# Patient Record
Sex: MALE | Race: OTHER | NOT HISPANIC OR LATINO | URBAN - METROPOLITAN AREA
[De-identification: names, ages, dates, MRNs, and addresses within clinical notes are randomized per-mention and may not be internally consistent; named-entity substitution may affect disease eponyms.]

---

## 2020-09-18 VITALS
OXYGEN SATURATION: 99 % | SYSTOLIC BLOOD PRESSURE: 137 MMHG | TEMPERATURE: 98 F | HEIGHT: 77 IN | DIASTOLIC BLOOD PRESSURE: 86 MMHG | HEART RATE: 77 BPM | WEIGHT: 199.96 LBS | RESPIRATION RATE: 18 BRPM

## 2020-09-18 LAB
ALBUMIN SERPL ELPH-MCNC: 4.4 G/DL — SIGNIFICANT CHANGE UP (ref 3.3–5)
ALP SERPL-CCNC: 62 U/L — SIGNIFICANT CHANGE UP (ref 40–120)
ALT FLD-CCNC: 7 U/L — LOW (ref 10–45)
ANION GAP SERPL CALC-SCNC: 12 MMOL/L — SIGNIFICANT CHANGE UP (ref 5–17)
APPEARANCE UR: CLEAR — SIGNIFICANT CHANGE UP
APTT BLD: 32.2 SEC — SIGNIFICANT CHANGE UP (ref 27.5–35.5)
AST SERPL-CCNC: 26 U/L — SIGNIFICANT CHANGE UP (ref 10–40)
BASOPHILS # BLD AUTO: 0.03 K/UL — SIGNIFICANT CHANGE UP (ref 0–0.2)
BASOPHILS NFR BLD AUTO: 0.5 % — SIGNIFICANT CHANGE UP (ref 0–2)
BILIRUB SERPL-MCNC: 0.7 MG/DL — SIGNIFICANT CHANGE UP (ref 0.2–1.2)
BILIRUB UR-MCNC: NEGATIVE — SIGNIFICANT CHANGE UP
BUN SERPL-MCNC: 21 MG/DL — SIGNIFICANT CHANGE UP (ref 7–23)
CALCIUM SERPL-MCNC: 9.5 MG/DL — SIGNIFICANT CHANGE UP (ref 8.4–10.5)
CHLORIDE SERPL-SCNC: 105 MMOL/L — SIGNIFICANT CHANGE UP (ref 96–108)
CK MB CFR SERPL CALC: 2.3 NG/ML — SIGNIFICANT CHANGE UP (ref 0–6.7)
CK SERPL-CCNC: 147 U/L — SIGNIFICANT CHANGE UP (ref 30–200)
CO2 SERPL-SCNC: 22 MMOL/L — SIGNIFICANT CHANGE UP (ref 22–31)
COLOR SPEC: YELLOW — SIGNIFICANT CHANGE UP
CREAT SERPL-MCNC: 1.09 MG/DL — SIGNIFICANT CHANGE UP (ref 0.5–1.3)
DIFF PNL FLD: NEGATIVE — SIGNIFICANT CHANGE UP
EOSINOPHIL # BLD AUTO: 0.06 K/UL — SIGNIFICANT CHANGE UP (ref 0–0.5)
EOSINOPHIL NFR BLD AUTO: 1.1 % — SIGNIFICANT CHANGE UP (ref 0–6)
GLUCOSE SERPL-MCNC: 121 MG/DL — HIGH (ref 70–99)
GLUCOSE UR QL: NEGATIVE — SIGNIFICANT CHANGE UP
HCT VFR BLD CALC: 43.7 % — SIGNIFICANT CHANGE UP (ref 39–50)
HGB BLD-MCNC: 14.7 G/DL — SIGNIFICANT CHANGE UP (ref 13–17)
IMM GRANULOCYTES NFR BLD AUTO: 0.2 % — SIGNIFICANT CHANGE UP (ref 0–1.5)
INR BLD: 0.97 — SIGNIFICANT CHANGE UP (ref 0.88–1.16)
KETONES UR-MCNC: NEGATIVE — SIGNIFICANT CHANGE UP
LEUKOCYTE ESTERASE UR-ACNC: NEGATIVE — SIGNIFICANT CHANGE UP
LIDOCAIN IGE QN: 55 U/L — SIGNIFICANT CHANGE UP (ref 7–60)
LYMPHOCYTES # BLD AUTO: 1.69 K/UL — SIGNIFICANT CHANGE UP (ref 1–3.3)
LYMPHOCYTES # BLD AUTO: 30.3 % — SIGNIFICANT CHANGE UP (ref 13–44)
MCHC RBC-ENTMCNC: 28.4 PG — SIGNIFICANT CHANGE UP (ref 27–34)
MCHC RBC-ENTMCNC: 33.6 GM/DL — SIGNIFICANT CHANGE UP (ref 32–36)
MCV RBC AUTO: 84.4 FL — SIGNIFICANT CHANGE UP (ref 80–100)
MONOCYTES # BLD AUTO: 0.37 K/UL — SIGNIFICANT CHANGE UP (ref 0–0.9)
MONOCYTES NFR BLD AUTO: 6.6 % — SIGNIFICANT CHANGE UP (ref 2–14)
NEUTROPHILS # BLD AUTO: 3.41 K/UL — SIGNIFICANT CHANGE UP (ref 1.8–7.4)
NEUTROPHILS NFR BLD AUTO: 61.3 % — SIGNIFICANT CHANGE UP (ref 43–77)
NITRITE UR-MCNC: NEGATIVE — SIGNIFICANT CHANGE UP
NRBC # BLD: 0 /100 WBCS — SIGNIFICANT CHANGE UP (ref 0–0)
PH UR: 6 — SIGNIFICANT CHANGE UP (ref 5–8)
PLATELET # BLD AUTO: 217 K/UL — SIGNIFICANT CHANGE UP (ref 150–400)
POTASSIUM SERPL-MCNC: 4.2 MMOL/L — SIGNIFICANT CHANGE UP (ref 3.5–5.3)
POTASSIUM SERPL-SCNC: 4.2 MMOL/L — SIGNIFICANT CHANGE UP (ref 3.5–5.3)
PROT SERPL-MCNC: 7.1 G/DL — SIGNIFICANT CHANGE UP (ref 6–8.3)
PROT UR-MCNC: NEGATIVE MG/DL — SIGNIFICANT CHANGE UP
PROTHROM AB SERPL-ACNC: 11.6 SEC — SIGNIFICANT CHANGE UP (ref 10.6–13.6)
RBC # BLD: 5.18 M/UL — SIGNIFICANT CHANGE UP (ref 4.2–5.8)
RBC # FLD: 12.4 % — SIGNIFICANT CHANGE UP (ref 10.3–14.5)
SODIUM SERPL-SCNC: 139 MMOL/L — SIGNIFICANT CHANGE UP (ref 135–145)
SP GR SPEC: 1.01 — SIGNIFICANT CHANGE UP (ref 1–1.03)
TROPONIN T SERPL-MCNC: <0.01 NG/ML — SIGNIFICANT CHANGE UP (ref 0–0.01)
UROBILINOGEN FLD QL: 0.2 E.U./DL — SIGNIFICANT CHANGE UP
WBC # BLD: 5.57 K/UL — SIGNIFICANT CHANGE UP (ref 3.8–10.5)
WBC # FLD AUTO: 5.57 K/UL — SIGNIFICANT CHANGE UP (ref 3.8–10.5)

## 2020-09-18 PROCEDURE — 71275 CT ANGIOGRAPHY CHEST: CPT | Mod: 26

## 2020-09-18 RX ORDER — FAMOTIDINE 10 MG/ML
20 INJECTION INTRAVENOUS ONCE
Refills: 0 | Status: COMPLETED | OUTPATIENT
Start: 2020-09-18 | End: 2020-09-18

## 2020-09-18 RX ORDER — PREGABALIN 225 MG/1
1 CAPSULE ORAL
Qty: 0 | Refills: 0 | DISCHARGE

## 2020-09-18 RX ORDER — ASPIRIN/CALCIUM CARB/MAGNESIUM 324 MG
325 TABLET ORAL ONCE
Refills: 0 | Status: COMPLETED | OUTPATIENT
Start: 2020-09-18 | End: 2020-09-18

## 2020-09-18 RX ORDER — MAGNESIUM OXIDE 400 MG ORAL TABLET 241.3 MG
800 TABLET ORAL
Qty: 0 | Refills: 0 | DISCHARGE

## 2020-09-18 RX ORDER — CHOLECALCIFEROL (VITAMIN D3) 125 MCG
0 CAPSULE ORAL
Qty: 0 | Refills: 0 | DISCHARGE

## 2020-09-18 NOTE — ED ADULT NURSE NOTE - NSIMPLEMENTINTERV_GEN_ALL_ED
Implemented All Universal Safety Interventions:  [place] to call system. Call bell, personal items and telephone within reach. Instruct patient to call for assistance. Room bathroom lighting operational. Non-slip footwear when patient is off stretcher. Physically safe environment: no spills, clutter or unnecessary equipment. Stretcher in lowest position, wheels locked, appropriate side rails in place.

## 2020-09-19 ENCOUNTER — INPATIENT (INPATIENT)
Facility: HOSPITAL | Age: 50
LOS: 0 days | Discharge: HOME | DRG: 313 | End: 2020-09-19
Attending: INTERNAL MEDICINE | Admitting: INTERNAL MEDICINE
Payer: COMMERCIAL

## 2020-09-19 VITALS
DIASTOLIC BLOOD PRESSURE: 79 MMHG | SYSTOLIC BLOOD PRESSURE: 122 MMHG | OXYGEN SATURATION: 97 % | TEMPERATURE: 98 F | HEART RATE: 72 BPM | RESPIRATION RATE: 18 BRPM

## 2020-09-19 DIAGNOSIS — R07.9 CHEST PAIN, UNSPECIFIED: ICD-10-CM

## 2020-09-19 LAB
A1C WITH ESTIMATED AVERAGE GLUCOSE RESULT: 5.2 % — SIGNIFICANT CHANGE UP (ref 4–5.6)
ALBUMIN SERPL ELPH-MCNC: 4.2 G/DL — SIGNIFICANT CHANGE UP (ref 3.3–5)
ALP SERPL-CCNC: 54 U/L — SIGNIFICANT CHANGE UP (ref 40–120)
ALT FLD-CCNC: 9 U/L — LOW (ref 10–45)
ANION GAP SERPL CALC-SCNC: 11 MMOL/L — SIGNIFICANT CHANGE UP (ref 5–17)
ANION GAP SERPL CALC-SCNC: 9 MMOL/L — SIGNIFICANT CHANGE UP (ref 5–17)
APTT BLD: 30.2 SEC — SIGNIFICANT CHANGE UP (ref 27.5–35.5)
AST SERPL-CCNC: 19 U/L — SIGNIFICANT CHANGE UP (ref 10–40)
BILIRUB SERPL-MCNC: 0.7 MG/DL — SIGNIFICANT CHANGE UP (ref 0.2–1.2)
BUN SERPL-MCNC: 19 MG/DL — SIGNIFICANT CHANGE UP (ref 7–23)
BUN SERPL-MCNC: 20 MG/DL — SIGNIFICANT CHANGE UP (ref 7–23)
CALCIUM SERPL-MCNC: 9 MG/DL — SIGNIFICANT CHANGE UP (ref 8.4–10.5)
CALCIUM SERPL-MCNC: 9.1 MG/DL — SIGNIFICANT CHANGE UP (ref 8.4–10.5)
CHLORIDE SERPL-SCNC: 106 MMOL/L — SIGNIFICANT CHANGE UP (ref 96–108)
CHLORIDE SERPL-SCNC: 107 MMOL/L — SIGNIFICANT CHANGE UP (ref 96–108)
CHOLEST SERPL-MCNC: 206 MG/DL — HIGH (ref 10–199)
CK MB CFR SERPL CALC: 1.9 NG/ML — SIGNIFICANT CHANGE UP (ref 0–6.7)
CK MB CFR SERPL CALC: 2.3 NG/ML — SIGNIFICANT CHANGE UP (ref 0–6.7)
CK SERPL-CCNC: 126 U/L — SIGNIFICANT CHANGE UP (ref 30–200)
CK SERPL-CCNC: 153 U/L — SIGNIFICANT CHANGE UP (ref 30–200)
CO2 SERPL-SCNC: 25 MMOL/L — SIGNIFICANT CHANGE UP (ref 22–31)
CO2 SERPL-SCNC: 27 MMOL/L — SIGNIFICANT CHANGE UP (ref 22–31)
CREAT SERPL-MCNC: 1.1 MG/DL — SIGNIFICANT CHANGE UP (ref 0.5–1.3)
CREAT SERPL-MCNC: 1.12 MG/DL — SIGNIFICANT CHANGE UP (ref 0.5–1.3)
CRP SERPL-MCNC: <0.03 MG/DL — SIGNIFICANT CHANGE UP (ref 0–0.4)
D DIMER BLD IA.RAPID-MCNC: <150 NG/ML DDU — SIGNIFICANT CHANGE UP
ERYTHROCYTE [SEDIMENTATION RATE] IN BLOOD: 2 MM/HR — SIGNIFICANT CHANGE UP
ESTIMATED AVERAGE GLUCOSE: 103 MG/DL — SIGNIFICANT CHANGE UP (ref 68–114)
GLUCOSE SERPL-MCNC: 90 MG/DL — SIGNIFICANT CHANGE UP (ref 70–99)
GLUCOSE SERPL-MCNC: 91 MG/DL — SIGNIFICANT CHANGE UP (ref 70–99)
HCT VFR BLD CALC: 42 % — SIGNIFICANT CHANGE UP (ref 39–50)
HDLC SERPL-MCNC: 66 MG/DL — SIGNIFICANT CHANGE UP
HGB BLD-MCNC: 13.8 G/DL — SIGNIFICANT CHANGE UP (ref 13–17)
INR BLD: 0.94 — SIGNIFICANT CHANGE UP (ref 0.88–1.16)
LIPID PNL WITH DIRECT LDL SERPL: 129 MG/DL — HIGH
MAGNESIUM SERPL-MCNC: 2.1 MG/DL — SIGNIFICANT CHANGE UP (ref 1.6–2.6)
MCHC RBC-ENTMCNC: 27.9 PG — SIGNIFICANT CHANGE UP (ref 27–34)
MCHC RBC-ENTMCNC: 32.9 GM/DL — SIGNIFICANT CHANGE UP (ref 32–36)
MCV RBC AUTO: 85 FL — SIGNIFICANT CHANGE UP (ref 80–100)
NRBC # BLD: 0 /100 WBCS — SIGNIFICANT CHANGE UP (ref 0–0)
PLATELET # BLD AUTO: 186 K/UL — SIGNIFICANT CHANGE UP (ref 150–400)
POTASSIUM SERPL-MCNC: 3.8 MMOL/L — SIGNIFICANT CHANGE UP (ref 3.5–5.3)
POTASSIUM SERPL-MCNC: 3.8 MMOL/L — SIGNIFICANT CHANGE UP (ref 3.5–5.3)
POTASSIUM SERPL-SCNC: 3.8 MMOL/L — SIGNIFICANT CHANGE UP (ref 3.5–5.3)
POTASSIUM SERPL-SCNC: 3.8 MMOL/L — SIGNIFICANT CHANGE UP (ref 3.5–5.3)
PROT SERPL-MCNC: 6.4 G/DL — SIGNIFICANT CHANGE UP (ref 6–8.3)
PROTHROM AB SERPL-ACNC: 11.3 SEC — SIGNIFICANT CHANGE UP (ref 10.6–13.6)
RBC # BLD: 4.94 M/UL — SIGNIFICANT CHANGE UP (ref 4.2–5.8)
RBC # FLD: 12.5 % — SIGNIFICANT CHANGE UP (ref 10.3–14.5)
SARS-COV-2 RNA SPEC QL NAA+PROBE: SIGNIFICANT CHANGE UP
SODIUM SERPL-SCNC: 142 MMOL/L — SIGNIFICANT CHANGE UP (ref 135–145)
SODIUM SERPL-SCNC: 143 MMOL/L — SIGNIFICANT CHANGE UP (ref 135–145)
TOTAL CHOLESTEROL/HDL RATIO MEASUREMENT: 3.1 RATIO — LOW (ref 3.4–9.6)
TRIGL SERPL-MCNC: 57 MG/DL — SIGNIFICANT CHANGE UP (ref 10–149)
TROPONIN T SERPL-MCNC: <0.01 NG/ML — SIGNIFICANT CHANGE UP (ref 0–0.01)
TROPONIN T SERPL-MCNC: <0.01 NG/ML — SIGNIFICANT CHANGE UP (ref 0–0.01)
TSH SERPL-MCNC: 1.67 UIU/ML — SIGNIFICANT CHANGE UP (ref 0.35–4.94)
WBC # BLD: 5.95 K/UL — SIGNIFICANT CHANGE UP (ref 3.8–10.5)
WBC # FLD AUTO: 5.95 K/UL — SIGNIFICANT CHANGE UP (ref 3.8–10.5)

## 2020-09-19 PROCEDURE — 85025 COMPLETE CBC W/AUTO DIFF WBC: CPT

## 2020-09-19 PROCEDURE — 93306 TTE W/DOPPLER COMPLETE: CPT

## 2020-09-19 PROCEDURE — 84484 ASSAY OF TROPONIN QUANT: CPT

## 2020-09-19 PROCEDURE — 84443 ASSAY THYROID STIM HORMONE: CPT

## 2020-09-19 PROCEDURE — 85027 COMPLETE CBC AUTOMATED: CPT

## 2020-09-19 PROCEDURE — 86140 C-REACTIVE PROTEIN: CPT

## 2020-09-19 PROCEDURE — 85652 RBC SED RATE AUTOMATED: CPT

## 2020-09-19 PROCEDURE — 87635 SARS-COV-2 COVID-19 AMP PRB: CPT

## 2020-09-19 PROCEDURE — 83690 ASSAY OF LIPASE: CPT

## 2020-09-19 PROCEDURE — 93306 TTE W/DOPPLER COMPLETE: CPT | Mod: 26

## 2020-09-19 PROCEDURE — 80053 COMPREHEN METABOLIC PANEL: CPT

## 2020-09-19 PROCEDURE — 93005 ELECTROCARDIOGRAM TRACING: CPT

## 2020-09-19 PROCEDURE — 99285 EMERGENCY DEPT VISIT HI MDM: CPT

## 2020-09-19 PROCEDURE — 85610 PROTHROMBIN TIME: CPT

## 2020-09-19 PROCEDURE — 80048 BASIC METABOLIC PNL TOTAL CA: CPT

## 2020-09-19 PROCEDURE — 93010 ELECTROCARDIOGRAM REPORT: CPT | Mod: 59

## 2020-09-19 PROCEDURE — 83735 ASSAY OF MAGNESIUM: CPT

## 2020-09-19 PROCEDURE — 85379 FIBRIN DEGRADATION QUANT: CPT

## 2020-09-19 PROCEDURE — 82550 ASSAY OF CK (CPK): CPT

## 2020-09-19 PROCEDURE — 83036 HEMOGLOBIN GLYCOSYLATED A1C: CPT

## 2020-09-19 PROCEDURE — 99285 EMERGENCY DEPT VISIT HI MDM: CPT | Mod: 25

## 2020-09-19 PROCEDURE — 85730 THROMBOPLASTIN TIME PARTIAL: CPT

## 2020-09-19 PROCEDURE — 71275 CT ANGIOGRAPHY CHEST: CPT

## 2020-09-19 PROCEDURE — 80061 LIPID PANEL: CPT

## 2020-09-19 PROCEDURE — 81003 URINALYSIS AUTO W/O SCOPE: CPT

## 2020-09-19 PROCEDURE — 36415 COLL VENOUS BLD VENIPUNCTURE: CPT

## 2020-09-19 PROCEDURE — 82553 CREATINE MB FRACTION: CPT

## 2020-09-19 PROCEDURE — 96374 THER/PROPH/DIAG INJ IV PUSH: CPT | Mod: XU

## 2020-09-19 RX ORDER — POTASSIUM CHLORIDE 20 MEQ
20 PACKET (EA) ORAL ONCE
Refills: 0 | Status: COMPLETED | OUTPATIENT
Start: 2020-09-19 | End: 2020-09-19

## 2020-09-19 RX ORDER — ASPIRIN/CALCIUM CARB/MAGNESIUM 324 MG
81 TABLET ORAL DAILY
Refills: 0 | Status: DISCONTINUED | OUTPATIENT
Start: 2020-09-19 | End: 2020-09-19

## 2020-09-19 RX ORDER — PANTOPRAZOLE SODIUM 20 MG/1
40 TABLET, DELAYED RELEASE ORAL
Refills: 0 | Status: DISCONTINUED | OUTPATIENT
Start: 2020-09-19 | End: 2020-09-19

## 2020-09-19 RX ORDER — PANTOPRAZOLE SODIUM 20 MG/1
1 TABLET, DELAYED RELEASE ORAL
Qty: 30 | Refills: 3
Start: 2020-09-19 | End: 2021-01-16

## 2020-09-19 RX ORDER — INFLUENZA VIRUS VACCINE 15; 15; 15; 15 UG/.5ML; UG/.5ML; UG/.5ML; UG/.5ML
0.5 SUSPENSION INTRAMUSCULAR ONCE
Refills: 0 | Status: COMPLETED | OUTPATIENT
Start: 2020-09-19 | End: 2020-09-19

## 2020-09-19 RX ADMIN — Medication 325 MILLIGRAM(S): at 00:04

## 2020-09-19 RX ADMIN — Medication 81 MILLIGRAM(S): at 11:55

## 2020-09-19 RX ADMIN — Medication 20 MILLIEQUIVALENT(S): at 09:49

## 2020-09-19 RX ADMIN — PANTOPRAZOLE SODIUM 40 MILLIGRAM(S): 20 TABLET, DELAYED RELEASE ORAL at 07:14

## 2020-09-19 RX ADMIN — Medication 30 MILLILITER(S): at 00:04

## 2020-09-19 RX ADMIN — FAMOTIDINE 20 MILLIGRAM(S): 10 INJECTION INTRAVENOUS at 00:04

## 2020-09-19 NOTE — H&P ADULT - ASSESSMENT
50 yr old no PMHx, recently traveled from [place] 2 days ago who presents to [facility] ED 9/18/20 c/o waxing and waning SSCP associated with SOB, EKG revealed NSR@73BPM with no acute ST/T wave changes. CXR unremarkable. Labs notable for: Cardiac enzymes negative x 1 set, COVID PCR pending. CT Angio Chest revealed no evidence of pulmonary emboli.     Patient currently stable, [facility] cardiac [facility], serial cardiac enzymes and further cardiac work-up. 50 yr old no PMHx, recently traveled from [place] 2 days ago who presents to [facility] ED 9/18/20 c/o intermittent pleuritic chest pain associated with SOB, EKG revealed NSR@73BPM with no acute ST/T wave changes. CXR unremarkable. Labs notable for: Cardiac enzymes negative x 1 set, COVID PCR pending. CT Angio Chest revealed no evidence of pulmonary emboli.     Patient currently stable, [facility], serial cardiac enzymes and further cardiac work-up.

## 2020-09-19 NOTE — H&P ADULT - NEGATIVE CARDIOVASCULAR SYMPTOMS
no dyspnea on exertion/no orthopnea/no peripheral edema/no palpitations/no claudication/no paroxysmal nocturnal dyspnea

## 2020-09-19 NOTE — DISCHARGE NOTE PROVIDER - NSDCCPCAREPLAN_GEN_ALL_CORE_FT
PRINCIPAL DISCHARGE DIAGNOSIS  Diagnosis: Chest pain  Assessment and Plan of Treatment: You presented to the hospital with a complaint of chest pain. An EKG and serial cardiac enzymes were checke and it was determined that you were not experiencing a heart attack. Furthermore, an echocardiom was performed and showed that your heart was pumping normally and there was no evidence of any valvular issues. At this time, there is no concern that the chest pain you experienced is cardiac in nature. Chest pain may be caused by numerous other conditions, including anxiety, stress, and acid reflux. You should follow up with your outpatient provider for further evaluation and management. If you experience any worsening or progression of your symptoms, please return to the ER.      SECONDARY DISCHARGE DIAGNOSES  Diagnosis: Hyperlipidemia  Assessment and Plan of Treatment: While you were at the hospital, a lipid panel was performed that showed your total cholesterol is 206, triglycerides are 57, HDL is 66, and LDL is 129. At this time, we are recommending that you make lifestyle modifications to help better control your cholesterol and do not feel you require any medications to treat this. Please ensure you are consuming a heart healthy diets with adequate fruits and vegetables and are exercising regularly.

## 2020-09-19 NOTE — DISCHARGE NOTE PROVIDER - PROVIDER TOKENS
FREE:[LAST:[Markell],FIRST:[Sylvester],PHONE:[(   )    -],FAX:[(   )    -],ADDRESS:[Primary Care Internal Medicine],FOLLOWUP:[1 week],ESTABLISHEDPATIENT:[T]]

## 2020-09-19 NOTE — H&P ADULT - PROBLEM SELECTOR PLAN 1
currently chest pain free, EKG NSR@73BPM with no acute ST/T wave changes. CXR unremarkable. Labs notable for: Cardiac enzymes negative x 1 set, COVID PCR pending.   --CT Angio Chest revealed no evidence of PE. There is bowing of the intraventricular septum towards the left with enlargement of the right ventricular chamber which is suggestive for some degree of right heart strain.   --Will F/U cardiac enzymes@1AM and 5AM.  --Obtain Echocardiogram in AM to further assess for right heart strain. currently endorsing 1.5/10 chest discomfort. EKG NSR@73BPM with no acute ST/T wave changes. CXR unremarkable. Labs notable for: Cardiac enzymes negative x 1 set, COVID PCR pending.   --CT Angio Chest revealed no evidence of PE. There is bowing of the intraventricular septum towards the left with enlargement of the right ventricular chamber which is suggestive for some degree of right heart strain.   --Will F/U cardiac enzymes@1AM and 5AM.  --Obtain Echocardiogram in AM to further assess for right heart strain.  --Pt was recommended for NST prior to COVID pandemic but was cancelled   --Pt had outpt cardiac monitor for dizziness in recent past but doesn't know results or remember cardiologist's name. Have pt reattempt to find out cardiologist's name in AM currently endorsing 1.5/10 chest discomfort. EKG NSR@73BPM with no acute ST/T wave changes. CXR unremarkable. Labs notable for: Cardiac enzymes negative x 1 set, COVID PCR pending.   --CT Angio Chest revealed no evidence of PE. There is bowing of the intraventricular septum towards the left with enlargement of the right ventricular chamber which is suggestive for some degree of right heart strain.   --Will F/U cardiac enzymes@1AM and 5AM.  --Obtain Echocardiogram in AM to further assess for right heart strain.  --Pt was recommended for NST prior to COVID pandemic but was cancelled   --Pt had outpt cardiac monitor for dizziness in recent past but doesn't know results or remember cardiologist's name. Have pt reattempt to find out cardiologist's name in AM  -- f/u AM ESR, CRP, Lipid profile, Hemoglobin A1C, and TSH. currently endorsing 1.5/10 chest discomfort. EKG NSR@73BPM with no acute ST/T wave changes. CXR unremarkable. Labs notable for: Cardiac enzymes negative x 1 set, COVID PCR pending.   --CT Angio Chest revealed no evidence of PE. There is bowing of the intraventricular septum towards the left with enlargement of the right ventricular chamber which is suggestive for some degree of right heart strain.   --Will F/U cardiac enzymes@1AM and 5AM.  -- Continue Aspirin 81mg daily and Pantoprazole 40mg daily in AM  --Obtain Echocardiogram in AM to further assess for right heart strain.  --Pt was recommended for NST prior to COVID pandemic but was cancelled   --Pt had outpt cardiac monitor for dizziness in recent past but doesn't know results or remember cardiologist's name. Have pt reattempt to find out cardiologist's name in AM  -- f/u AM ESR, CRP, Lipid profile, Hemoglobin A1C, and TSH.

## 2020-09-19 NOTE — DISCHARGE NOTE NURSING/CASE MANAGEMENT/SOCIAL WORK - PATIENT PORTAL LINK FT
You can access the FollowMyHealth Patient Portal offered by [facility] by registering at the following website: [email or web address]. By joining [employer]’s FollowMyHealth portal, you will also be able to view your health information using other applications (apps) compatible with our system.

## 2020-09-19 NOTE — DISCHARGE NOTE PROVIDER - CARE PROVIDER_API CALL
Sylvester Guillaume  Primary Care Internal Medicine  Phone: (   )    -  Fax: (   )    -  Established Patient  Follow Up Time: 1 week

## 2020-09-19 NOTE — ED PROVIDER NOTE - OBJECTIVE STATEMENT
Pt is a 50M who presents to ED for chest pain. pt states the pain began this evening and is described as a pressure, worse with taking a deep breath. pt recently flew from [place] and has been in [place] for 2 days. Pt has no significant medical history. Pt states he took pepcid with no improvement of symptoms. No nausea, no numbness or tingling. Pt well appearing in ED. vitals stable.

## 2020-09-19 NOTE — ED PROVIDER NOTE - CLINICAL SUMMARY MEDICAL DECISION MAKING FREE TEXT BOX
Pt is a 50M with chest pain. Labs done, CTA chest to r/o PE, EKG done. Pt given ASA 325mg PO, pepcid 20mg IV and Maalox 30mg PO in ED.   Cardiology fellow contacted and case discussed. Pt with possible right heart strain on CTA. Pt admitted.

## 2020-09-19 NOTE — DISCHARGE NOTE PROVIDER - HOSPITAL COURSE
50 yr old active male, PMHx Cluster headaches and Raynauds syndrome flew in from [place] on 09/18/2020 and ate spicy food for dinner when he experienced substernal chest discomfort, worsened by deep inspiration and with moving his arms. Pt took antacid which did not relieve symptoms. Pt has had intermittent dizziness during and after intense workouts for which he saw a cardiologist who had him wear a heart monitor patch but pt has not received results yet. Pt was scheduled for a NST as outpt prior to [facility] and his appointment was cancelled. Pt has intermittent PND after eating late at night. Pt did have a mild sore throat and runny nose 3 weeks prior to admission. Patient denies any current N/V, diaphoresis, palpitations, orthopnea, LE edema, orthopnea, calf pain/swelling, fevers/chills, cough, or prior cardiac work-up. In ED, BP: 137/86, HR: 77, RR: 18, Temp: 98.3F, O2 sat: 99% RA. EKG revealed NSR@73BPM with no acute ST/T wave changes. CXR unremarkable. Labs notable for: Cardiac enzymes negative x 1 set, COVID PCR pending. CT Angio Chest revealed no evidence of pulmonary emboli. There is bowing of the intraventricular septum towards the left with enlargement of the right ventricular chamber which is suggestive for some degree of right heart strain. Consider further evaluation with echocardiogram. Patient treated with ASA 325mg PO x 1 dose, Maalox 30mL PO x 1 dose and Pepcid 20mg IV x 1 dose. Patient was admitted to [facility] for cardiac telemetry, serial cardiac enzymes and further cardiac work-up. ESR and CRP was within normal limits. Patient underwent echocardiogram which showed normal left and right ventricular size and systolic function, no significant valvular disease, no evidence of pulmonary hypertension, PASP 22 mmHg, no pericardial effusion. Patient has now been medically cleared for discharge as per Dr. Zabala.     Patient has been given appropriate discharge instructions including medication regimen, access site management and follow up. Medications that patient needs refills on have been e-prescribed to preferred pharmacy.     VS Stable   Gen: NAD, A&O x3  Cards: RRR, clear S1 and S2 without murmur  Pulm: CTA B/L without w/r/r  Abd: soft, NT  Ext: no LE edema or ulcerations B/L 50 yr old active male, PMHx Cluster headaches and Raynauds syndrome flew in from [place] on 09/18/2020 and ate spicy food for dinner when he experienced substernal chest discomfort, worsened by deep inspiration and with moving his arms. Pt took antacid which did not relieve symptoms. Pt has had intermittent dizziness during and after intense workouts for which he saw a cardiologist who had him wear a heart monitor patch but pt has not received results yet. Pt was scheduled for a NST as outpt prior to COVID and his appointment was cancelled. Pt has intermittent PND after eating late at night. Pt did have a mild sore throat and runny nose 3 weeks prior to admission. Patient denies any current N/V, diaphoresis, palpitations, orthopnea, LE edema, orthopnea, calf pain/swelling, fevers/chills, cough, or prior cardiac work-up. In ED, BP: 137/86, HR: 77, RR: 18, Temp: 98.3F, O2 sat: 99% RA. EKG revealed NSR@73BPM with no acute ST/T wave changes. CXR unremarkable. Labs notable for: Cardiac enzymes negative x 3 sets, COVID PCR negative. CT Angio Chest revealed no evidence of pulmonary emboli. There is bowing of the intraventricular septum towards the left with enlargement of the right ventricular chamber which is suggestive for some degree of right heart strain. Consider further evaluation with echocardiogram. Patient treated with ASA 325mg PO x 1 dose, Maalox 30mL PO x 1 dose and Pepcid 20mg IV x 1 dose. Patient was admitted to [facility] for cardiac telemetry, serial cardiac enzymes and further cardiac work-up. ESR and CRP was within normal limits. Patient underwent echocardiogram which showed normal left and right ventricular size and systolic function, no significant valvular disease, no evidence of pulmonary hypertension, PASP 22 mmHg, no pericardial effusion. Patient has now been medically cleared for discharge as per Dr. Zabala.     Patient has been given appropriate discharge instructions including medication regimen, access site management and follow up. Medications that patient needs refills on have been e-prescribed to preferred pharmacy.     VS Stable   Gen: NAD, A&O x3  Cards: RRR, clear S1 and S2 without murmur  Pulm: CTA B/L without w/r/r  Abd: soft, NT  Ext: no LE edema or ulcerations B/L

## 2020-09-19 NOTE — DISCHARGE NOTE PROVIDER - NSDCMRMEDTOKEN_GEN_ALL_CORE_FT
magnesium oxide: 800 milligram(s) orally once a day  pantoprazole 40 mg oral delayed release tablet: 1 tab(s) orally once a day (before a meal)  Vitamin B-12 100 mcg oral tablet: 1 tab(s) orally once a day  Vitamin D3 1000 intl units (25 mcg) oral tablet:

## 2020-09-24 DIAGNOSIS — R07.2 PRECORDIAL PAIN: ICD-10-CM

## 2020-09-24 DIAGNOSIS — I73.00 RAYNAUD'S SYNDROME WITHOUT GANGRENE: ICD-10-CM

## 2020-09-24 DIAGNOSIS — Z23 ENCOUNTER FOR IMMUNIZATION: ICD-10-CM

## 2020-09-24 DIAGNOSIS — E78.5 HYPERLIPIDEMIA, UNSPECIFIED: ICD-10-CM

## 2020-09-25 NOTE — H&P ADULT - HISTORY OF PRESENT ILLNESS
26 y/o female c/o chest pain and bl leg numbness tingling  -unclear etiology, r/o electrolyte abnormalities  -cbc cmp cxr  -reassess 50 yr old no PMHx, recently traveled from [place] 2 days ago who presents to [facility] ED 9/18/20 c/o waxing and waning CP x 1 day.  Patient describes it as being substernal chest pressure, 5/10 in severity, associated with SOB.    Patient denies any N/V, diaphoresis, palpitations, dizziness, PND, orthopnea, LE edema or prior cardiac work-up.   In ED, BP: 137/86, HR: 77, RR: 18, Temp: 98.3F, O2 sat: 99% RA. EKG revealed NSR@73BPM with no acute ST/T wave changes. CXR unremarkable. Labs notable for: Cardiac enzymes negative x 1 set, COVID PCR pending. CT Angio Chest revealed no evidence of pulmonary emboli. There is bowing of the intraventricular septum towards the left with enlargement of the right ventricular chamber which is suggestive for some degree of right heart strain. Consider further evaluation with echocardiogram.  Patient treated with ASA 325mg PO x 1 dose, Maalox 30mL PO x 1 dose and Pepcid 20mg IV x 1 dose.   Patient currently stable, admitted to [facility] for cardiac telemetry, serial cardiac enzymes and further cardiac work-up.   50 yr old no PMHx, recently traveled from [place] 2 days ago who presents to [facility] ED 9/18/20 c/o intermittent CP x 1 day.  Patient describes it as being sharp substernal chest pain, 5/10 in severity, associated with SOB. Symptoms worse with inspiration and improved with rest.    Patient denies any N/V, diaphoresis, palpitations, dizziness, PND, orthopnea, LE edema or prior cardiac work-up.   In ED, BP: 137/86, HR: 77, RR: 18, Temp: 98.3F, O2 sat: 99% RA. EKG revealed NSR@73BPM with no acute ST/T wave changes. CXR unremarkable. Labs notable for: Cardiac enzymes negative x 1 set, COVID PCR pending. CT Angio Chest revealed no evidence of pulmonary emboli. There is bowing of the intraventricular septum towards the left with enlargement of the right ventricular chamber which is suggestive for some degree of right heart strain. Consider further evaluation with echocardiogram.  Patient treated with ASA 325mg PO x 1 dose, Maalox 30mL PO x 1 dose and Pepcid 20mg IV x 1 dose.   Patient currently stable, admitted to [facility] for cardiac telemetry, serial cardiac enzymes and further cardiac work-up.   50 yr old male, PMHx Cluster headaches and Raynauds syndrome recently traveled from [place] 2 days ago who presents to [facility] ED 9/18/20 c/o intermittent CP x 1 day.  Patient describes it as being sharp substernal chest pain, 5/10 in severity, associated with SOB. Symptoms worse with inspiration and improved with rest.    Patient denies any N/V, diaphoresis, palpitations, dizziness, PND, orthopnea, LE edema or prior cardiac work-up.   In ED, BP: 137/86, HR: 77, RR: 18, Temp: 98.3F, O2 sat: 99% RA. EKG revealed NSR@73BPM with no acute ST/T wave changes. CXR unremarkable. Labs notable for: Cardiac enzymes negative x 1 set, COVID PCR pending. CT Angio Chest revealed no evidence of pulmonary emboli. There is bowing of the intraventricular septum towards the left with enlargement of the right ventricular chamber which is suggestive for some degree of right heart strain. Consider further evaluation with echocardiogram.  Patient treated with ASA 325mg PO x 1 dose, Maalox 30mL PO x 1 dose and Pepcid 20mg IV x 1 dose.   Patient currently stable, admitted to [facility] for cardiac telemetry, serial cardiac enzymes and further cardiac work-up.   50 yr old active male, PMHx Cluster headaches and Raynauds syndrome flew in from [place] on 09/18/2020 and ate spicy food for dinner when he experienced substernal chest discomfort, worsened by deep inspiration and with moving his arms. Pt took antacid which did not relieve symptoms. Pt has had intermittent dizziness during and after intense workouts for which he saw a cardiologist who had him wear a heart monitor patch but pt has not received results yet. Pt was scheduled for a NST as outpt prior to [facility] and his appointment was cancelled. Pt has intermittent PND after eating late at night. Pt did have a mild sore throat and runny nose 3 weeks prior to admission. Patient denies any current N/V, diaphoresis, palpitations, orthopnea, LE edema, orthopnea, calf pain/swelling, fevers/chills, cough, or prior cardiac work-up. In ED, BP: 137/86, HR: 77, RR: 18, Temp: 98.3F, O2 sat: 99% RA. EKG revealed NSR@73BPM with no acute ST/T wave changes. CXR unremarkable. Labs notable for: Cardiac enzymes negative x 1 set, COVID PCR pending. CT Angio Chest revealed no evidence of pulmonary emboli. There is bowing of the intraventricular septum towards the left with enlargement of the right ventricular chamber which is suggestive for some degree of right heart strain. Consider further evaluation with echocardiogram. Patient treated with ASA 325mg PO x 1 dose, Maalox 30mL PO x 1 dose and Pepcid 20mg IV x 1 dose. Patient currently stable, admitted to [email or web address] for cardiac telemetry, serial cardiac enzymes and further cardiac work-up.

## 2022-07-19 NOTE — PATIENT PROFILE ADULT - NSPROSPHOSPCHAPLAINYN_GEN_A_NUR
Patient c/o right sided chest pain and shortness of breath since lifting a table on Sunday.     Triage Assessment     Row Name 07/19/22 1055       Triage Assessment (Adult)    Airway WDL WDL       Respiratory WDL    Respiratory WDL WDL              
no